# Patient Record
Sex: MALE | Race: BLACK OR AFRICAN AMERICAN | Employment: OTHER | URBAN - METROPOLITAN AREA
[De-identification: names, ages, dates, MRNs, and addresses within clinical notes are randomized per-mention and may not be internally consistent; named-entity substitution may affect disease eponyms.]

---

## 2023-05-18 ENCOUNTER — APPOINTMENT (OUTPATIENT)
Dept: GENERAL RADIOLOGY | Facility: HOSPITAL | Age: 73
End: 2023-05-18
Attending: EMERGENCY MEDICINE
Payer: MEDICARE

## 2023-05-18 ENCOUNTER — HOSPITAL ENCOUNTER (EMERGENCY)
Facility: HOSPITAL | Age: 73
Discharge: HOME | End: 2023-05-18
Attending: EMERGENCY MEDICINE
Payer: MEDICARE

## 2023-05-18 ENCOUNTER — APPOINTMENT (OUTPATIENT)
Dept: CT IMAGING | Facility: HOSPITAL | Age: 73
End: 2023-05-18
Attending: EMERGENCY MEDICINE
Payer: MEDICARE

## 2023-05-18 VITALS
SYSTOLIC BLOOD PRESSURE: 144 MMHG | RESPIRATION RATE: 17 BRPM | OXYGEN SATURATION: 97 % | TEMPERATURE: 97 F | HEART RATE: 64 BPM | DIASTOLIC BLOOD PRESSURE: 85 MMHG

## 2023-05-18 DIAGNOSIS — N18.9 CHRONIC KIDNEY DISEASE, UNSPECIFIED CKD STAGE: ICD-10-CM

## 2023-05-18 DIAGNOSIS — F17.200 TOBACCO USE DISORDER: ICD-10-CM

## 2023-05-18 DIAGNOSIS — I73.9 PERIPHERAL ARTERIAL DISEASE (HCC): Primary | ICD-10-CM

## 2023-05-18 LAB
ALBUMIN SERPL-MCNC: 3.4 G/DL (ref 3.4–5)
ALBUMIN SERPL-MCNC: 3.4 G/DL (ref 3.4–5)
ALBUMIN/GLOB SERPL: 0.7 {RATIO} (ref 1–2)
ALP LIVER SERPL-CCNC: 76 U/L
ALP LIVER SERPL-CCNC: 76 U/L
ALT SERPL-CCNC: 19 U/L
ALT SERPL-CCNC: 19 U/L
ANION GAP SERPL CALC-SCNC: 4 MMOL/L (ref 0–18)
AST SERPL-CCNC: 19 U/L (ref 15–37)
AST SERPL-CCNC: 19 U/L (ref 15–37)
BASOPHILS # BLD AUTO: 0.06 X10(3) UL (ref 0–0.2)
BASOPHILS NFR BLD AUTO: 0.6 %
BILIRUB DIRECT SERPL-MCNC: <0.1 MG/DL (ref 0–0.2)
BILIRUB SERPL-MCNC: 0.3 MG/DL (ref 0.1–2)
BILIRUB SERPL-MCNC: 0.3 MG/DL (ref 0.1–2)
BILIRUB UR QL: NEGATIVE
BUN BLD-MCNC: 27 MG/DL (ref 7–18)
BUN/CREAT SERPL: 14.6 (ref 10–20)
CALCIUM BLD-MCNC: 9.5 MG/DL (ref 8.5–10.1)
CHLORIDE SERPL-SCNC: 112 MMOL/L (ref 98–112)
CLARITY UR: CLEAR
CO2 SERPL-SCNC: 24 MMOL/L (ref 21–32)
COLOR UR: COLORLESS
CREAT BLD-MCNC: 1.85 MG/DL
DEPRECATED RDW RBC AUTO: 51.6 FL (ref 35.1–46.3)
EOSINOPHIL # BLD AUTO: 0.29 X10(3) UL (ref 0–0.7)
EOSINOPHIL NFR BLD AUTO: 2.8 %
ERYTHROCYTE [DISTWIDTH] IN BLOOD BY AUTOMATED COUNT: 16 % (ref 11–15)
GFR SERPLBLD BASED ON 1.73 SQ M-ARVRAT: 39 ML/MIN/1.73M2 (ref 60–?)
GLOBULIN PLAS-MCNC: 4.7 G/DL (ref 2.8–4.4)
GLUCOSE BLD-MCNC: 119 MG/DL (ref 70–99)
GLUCOSE BLDC GLUCOMTR-MCNC: 125 MG/DL (ref 70–99)
GLUCOSE UR-MCNC: NORMAL MG/DL
HCT VFR BLD AUTO: 41.2 %
HGB BLD-MCNC: 14 G/DL
HGB UR QL STRIP.AUTO: NEGATIVE
HYALINE CASTS #/AREA URNS AUTO: PRESENT /LPF
IMM GRANULOCYTES # BLD AUTO: 0.02 X10(3) UL (ref 0–1)
IMM GRANULOCYTES NFR BLD: 0.2 %
KETONES UR-MCNC: NEGATIVE MG/DL
LEUKOCYTE ESTERASE UR QL STRIP.AUTO: 25
LIPASE SERPL-CCNC: 45 U/L (ref 13–75)
LYMPHOCYTES # BLD AUTO: 5.16 X10(3) UL (ref 1–4)
LYMPHOCYTES NFR BLD AUTO: 49.8 %
MCH RBC QN AUTO: 30 PG (ref 26–34)
MCHC RBC AUTO-ENTMCNC: 34 G/DL (ref 31–37)
MCV RBC AUTO: 88.2 FL
MONOCYTES # BLD AUTO: 0.61 X10(3) UL (ref 0.1–1)
MONOCYTES NFR BLD AUTO: 5.9 %
NEUTROPHILS # BLD AUTO: 4.22 X10 (3) UL (ref 1.5–7.7)
NEUTROPHILS # BLD AUTO: 4.22 X10(3) UL (ref 1.5–7.7)
NEUTROPHILS NFR BLD AUTO: 40.7 %
NITRITE UR QL STRIP.AUTO: NEGATIVE
OSMOLALITY SERPL CALC.SUM OF ELEC: 296 MOSM/KG (ref 275–295)
PH UR: 5.5 [PH] (ref 5–8)
PLATELET # BLD AUTO: 282 10(3)UL (ref 150–450)
POTASSIUM SERPL-SCNC: 4.4 MMOL/L (ref 3.5–5.1)
PROT SERPL-MCNC: 8.1 G/DL (ref 6.4–8.2)
PROT SERPL-MCNC: 8.1 G/DL (ref 6.4–8.2)
PROT UR-MCNC: NEGATIVE MG/DL
RBC # BLD AUTO: 4.67 X10(6)UL
SODIUM SERPL-SCNC: 140 MMOL/L (ref 136–145)
SP GR UR STRIP: >1.03 (ref 1–1.03)
TROPONIN I HIGH SENSITIVITY: 12 NG/L
UROBILINOGEN UR STRIP-ACNC: NORMAL
WBC # BLD AUTO: 10.4 X10(3) UL (ref 4–11)

## 2023-05-18 PROCEDURE — 71045 X-RAY EXAM CHEST 1 VIEW: CPT | Performed by: EMERGENCY MEDICINE

## 2023-05-18 PROCEDURE — 99285 EMERGENCY DEPT VISIT HI MDM: CPT

## 2023-05-18 PROCEDURE — 81001 URINALYSIS AUTO W/SCOPE: CPT | Performed by: EMERGENCY MEDICINE

## 2023-05-18 PROCEDURE — 82248 BILIRUBIN DIRECT: CPT | Performed by: EMERGENCY MEDICINE

## 2023-05-18 PROCEDURE — 84484 ASSAY OF TROPONIN QUANT: CPT | Performed by: EMERGENCY MEDICINE

## 2023-05-18 PROCEDURE — 87086 URINE CULTURE/COLONY COUNT: CPT | Performed by: EMERGENCY MEDICINE

## 2023-05-18 PROCEDURE — 99284 EMERGENCY DEPT VISIT MOD MDM: CPT

## 2023-05-18 PROCEDURE — 80053 COMPREHEN METABOLIC PANEL: CPT | Performed by: EMERGENCY MEDICINE

## 2023-05-18 PROCEDURE — 83690 ASSAY OF LIPASE: CPT | Performed by: EMERGENCY MEDICINE

## 2023-05-18 PROCEDURE — 85025 COMPLETE CBC W/AUTO DIFF WBC: CPT | Performed by: EMERGENCY MEDICINE

## 2023-05-18 PROCEDURE — 82962 GLUCOSE BLOOD TEST: CPT

## 2023-05-18 PROCEDURE — 85060 BLOOD SMEAR INTERPRETATION: CPT | Performed by: EMERGENCY MEDICINE

## 2023-05-18 PROCEDURE — 93005 ELECTROCARDIOGRAM TRACING: CPT

## 2023-05-18 PROCEDURE — 36415 COLL VENOUS BLD VENIPUNCTURE: CPT

## 2023-05-18 PROCEDURE — 74177 CT ABD & PELVIS W/CONTRAST: CPT | Performed by: EMERGENCY MEDICINE

## 2023-05-18 PROCEDURE — 93010 ELECTROCARDIOGRAM REPORT: CPT

## 2023-05-18 RX ORDER — POLYETHYLENE GLYCOL 3350 17 G
2 POWDER IN PACKET (EA) ORAL AS NEEDED
Qty: 45 EACH | Refills: 0 | Status: SHIPPED | OUTPATIENT
Start: 2023-05-18 | End: 2023-05-18

## 2023-05-18 RX ORDER — POLYETHYLENE GLYCOL 3350 17 G
2 POWDER IN PACKET (EA) ORAL AS NEEDED
Qty: 45 LOZENGE | Refills: 0 | Status: SHIPPED | OUTPATIENT
Start: 2023-05-18

## 2023-05-18 NOTE — ED INITIAL ASSESSMENT (HPI)
After mowing lawn this am, pt developed generalized abdominal pain with vomiting x2 (which started just pta). Family endorse recent weight loss, thirst, frequent urination.  No hx of dm.    pmh of dementia, hchol  nkda

## 2023-05-19 LAB
ATRIAL RATE: 68 BPM
P AXIS: 81 DEGREES
P-R INTERVAL: 148 MS
Q-T INTERVAL: 384 MS
QRS DURATION: 106 MS
QTC CALCULATION (BEZET): 408 MS
R AXIS: 103 DEGREES
T AXIS: 63 DEGREES
VENTRICULAR RATE: 68 BPM

## 2024-07-26 ENCOUNTER — APPOINTMENT (OUTPATIENT)
Dept: GENERAL RADIOLOGY | Facility: HOSPITAL | Age: 74
End: 2024-07-26
Attending: EMERGENCY MEDICINE
Payer: MEDICARE

## 2024-07-26 ENCOUNTER — HOSPITAL ENCOUNTER (INPATIENT)
Facility: HOSPITAL | Age: 74
LOS: 1 days | Discharge: HOME | End: 2024-07-27
Attending: EMERGENCY MEDICINE | Admitting: HOSPITALIST
Payer: MEDICARE

## 2024-07-26 DIAGNOSIS — R07.9 CHEST PAIN WITH MODERATE RISK FOR CARDIAC ETIOLOGY: Primary | ICD-10-CM

## 2024-07-26 LAB
ANION GAP SERPL CALC-SCNC: 5 MMOL/L (ref 0–18)
BASOPHILS # BLD AUTO: 0.05 X10(3) UL (ref 0–0.2)
BASOPHILS NFR BLD AUTO: 0.6 %
BUN BLD-MCNC: 30 MG/DL (ref 9–23)
BUN/CREAT SERPL: 17.6 (ref 10–20)
CALCIUM BLD-MCNC: 9.5 MG/DL (ref 8.7–10.4)
CHLORIDE SERPL-SCNC: 113 MMOL/L (ref 98–112)
CO2 SERPL-SCNC: 27 MMOL/L (ref 21–32)
CREAT BLD-MCNC: 1.7 MG/DL
DEPRECATED RDW RBC AUTO: 49.1 FL (ref 35.1–46.3)
EGFRCR SERPLBLD CKD-EPI 2021: 43 ML/MIN/1.73M2 (ref 60–?)
EOSINOPHIL # BLD AUTO: 0.19 X10(3) UL (ref 0–0.7)
EOSINOPHIL NFR BLD AUTO: 2.1 %
ERYTHROCYTE [DISTWIDTH] IN BLOOD BY AUTOMATED COUNT: 15.9 % (ref 11–15)
GLUCOSE BLD-MCNC: 94 MG/DL (ref 70–99)
HCT VFR BLD AUTO: 38 %
HGB BLD-MCNC: 13.4 G/DL
IMM GRANULOCYTES # BLD AUTO: 0.02 X10(3) UL (ref 0–1)
IMM GRANULOCYTES NFR BLD: 0.2 %
LYMPHOCYTES # BLD AUTO: 4.65 X10(3) UL (ref 1–4)
LYMPHOCYTES NFR BLD AUTO: 52.2 %
MCH RBC QN AUTO: 30 PG (ref 26–34)
MCHC RBC AUTO-ENTMCNC: 35.3 G/DL (ref 31–37)
MCV RBC AUTO: 85.2 FL
MONOCYTES # BLD AUTO: 0.53 X10(3) UL (ref 0.1–1)
MONOCYTES NFR BLD AUTO: 6 %
NEUTROPHILS # BLD AUTO: 3.46 X10 (3) UL (ref 1.5–7.7)
NEUTROPHILS # BLD AUTO: 3.46 X10(3) UL (ref 1.5–7.7)
NEUTROPHILS NFR BLD AUTO: 38.9 %
OSMOLALITY SERPL CALC.SUM OF ELEC: 306 MOSM/KG (ref 275–295)
PLATELET # BLD AUTO: 279 10(3)UL (ref 150–450)
POTASSIUM SERPL-SCNC: 5.7 MMOL/L (ref 3.5–5.1)
RBC # BLD AUTO: 4.46 X10(6)UL
SODIUM SERPL-SCNC: 145 MMOL/L (ref 136–145)
TROPONIN I SERPL HS-MCNC: 5 NG/L
TROPONIN I SERPL HS-MCNC: 6 NG/L
WBC # BLD AUTO: 8.9 X10(3) UL (ref 4–11)

## 2024-07-26 PROCEDURE — 99222 1ST HOSP IP/OBS MODERATE 55: CPT | Performed by: HOSPITALIST

## 2024-07-26 PROCEDURE — 71045 X-RAY EXAM CHEST 1 VIEW: CPT | Performed by: EMERGENCY MEDICINE

## 2024-07-26 RX ORDER — MORPHINE SULFATE 2 MG/ML
2 INJECTION, SOLUTION INTRAMUSCULAR; INTRAVENOUS ONCE
Status: COMPLETED | OUTPATIENT
Start: 2024-07-26 | End: 2024-07-26

## 2024-07-27 ENCOUNTER — APPOINTMENT (OUTPATIENT)
Dept: CV DIAGNOSTICS | Facility: HOSPITAL | Age: 74
End: 2024-07-27
Attending: HOSPITALIST
Payer: MEDICARE

## 2024-07-27 ENCOUNTER — APPOINTMENT (OUTPATIENT)
Dept: NUCLEAR MEDICINE | Facility: HOSPITAL | Age: 74
End: 2024-07-27
Attending: HOSPITALIST
Payer: MEDICARE

## 2024-07-27 VITALS
OXYGEN SATURATION: 98 % | WEIGHT: 138.63 LBS | HEART RATE: 52 BPM | DIASTOLIC BLOOD PRESSURE: 78 MMHG | TEMPERATURE: 99 F | BODY MASS INDEX: 18.78 KG/M2 | HEIGHT: 72 IN | SYSTOLIC BLOOD PRESSURE: 154 MMHG | RESPIRATION RATE: 18 BRPM

## 2024-07-27 LAB
AMPHET UR QL SCN: NEGATIVE
ANION GAP SERPL CALC-SCNC: 3 MMOL/L (ref 0–18)
BARBITURATES UR QL SCN: NEGATIVE
BENZODIAZ UR QL SCN: NEGATIVE
BUN BLD-MCNC: 28 MG/DL (ref 9–23)
BUN/CREAT SERPL: 17.8 (ref 10–20)
CALCIUM BLD-MCNC: 9.5 MG/DL (ref 8.7–10.4)
CANNABINOIDS UR QL SCN: NEGATIVE
CHLORIDE SERPL-SCNC: 113 MMOL/L (ref 98–112)
CO2 SERPL-SCNC: 27 MMOL/L (ref 21–32)
COCAINE UR QL: NEGATIVE
CREAT BLD-MCNC: 1.57 MG/DL
CREAT UR-SCNC: 59.8 MG/DL
EGFRCR SERPLBLD CKD-EPI 2021: 47 ML/MIN/1.73M2 (ref 60–?)
FENTANYL UR QL SCN: NEGATIVE
GLUCOSE BLD-MCNC: 89 MG/DL (ref 70–99)
MDMA UR QL SCN: NEGATIVE
METHADONE UR QL SCN: NEGATIVE
OSMOLALITY SERPL CALC.SUM OF ELEC: 301 MOSM/KG (ref 275–295)
OXYCODONE UR QL SCN: NEGATIVE
PCP UR QL SCN: NEGATIVE
POTASSIUM SERPL-SCNC: 5 MMOL/L (ref 3.5–5.1)
SODIUM SERPL-SCNC: 143 MMOL/L (ref 136–145)
TROPONIN I SERPL HS-MCNC: 6 NG/L

## 2024-07-27 PROCEDURE — 99239 HOSP IP/OBS DSCHRG MGMT >30: CPT | Performed by: HOSPITALIST

## 2024-07-27 PROCEDURE — 93017 CV STRESS TEST TRACING ONLY: CPT | Performed by: HOSPITALIST

## 2024-07-27 PROCEDURE — 78452 HT MUSCLE IMAGE SPECT MULT: CPT | Performed by: HOSPITALIST

## 2024-07-27 RX ORDER — PANTOPRAZOLE SODIUM 40 MG/1
40 TABLET, DELAYED RELEASE ORAL
Status: DISCONTINUED | OUTPATIENT
Start: 2024-07-27 | End: 2024-07-27

## 2024-07-27 RX ORDER — ONDANSETRON 2 MG/ML
4 INJECTION INTRAMUSCULAR; INTRAVENOUS EVERY 6 HOURS PRN
Status: DISCONTINUED | OUTPATIENT
Start: 2024-07-27 | End: 2024-07-27

## 2024-07-27 RX ORDER — ACETAMINOPHEN 325 MG/1
650 TABLET ORAL EVERY 6 HOURS PRN
Status: DISCONTINUED | OUTPATIENT
Start: 2024-07-27 | End: 2024-07-27

## 2024-07-27 RX ORDER — MAGNESIUM HYDROXIDE/ALUMINUM HYDROXICE/SIMETHICONE 120; 1200; 1200 MG/30ML; MG/30ML; MG/30ML
30 SUSPENSION ORAL 4 TIMES DAILY PRN
Status: DISCONTINUED | OUTPATIENT
Start: 2024-07-27 | End: 2024-07-27

## 2024-07-27 RX ORDER — HYDROCODONE BITARTRATE AND ACETAMINOPHEN 5; 325 MG/1; MG/1
1 TABLET ORAL EVERY 4 HOURS PRN
COMMUNITY

## 2024-07-27 RX ORDER — ATORVASTATIN CALCIUM 40 MG/1
40 TABLET, FILM COATED ORAL DAILY
Status: DISCONTINUED | OUTPATIENT
Start: 2024-07-27 | End: 2024-07-27

## 2024-07-27 RX ORDER — ATORVASTATIN CALCIUM 10 MG/1
10 TABLET, FILM COATED ORAL DAILY
Status: DISCONTINUED | OUTPATIENT
Start: 2024-07-27 | End: 2024-07-27

## 2024-07-27 RX ORDER — REGADENOSON 0.08 MG/ML
INJECTION, SOLUTION INTRAVENOUS
Status: COMPLETED
Start: 2024-07-27 | End: 2024-07-27

## 2024-07-27 RX ORDER — ZOLPIDEM TARTRATE 5 MG/1
5 TABLET ORAL NIGHTLY PRN
Status: DISCONTINUED | OUTPATIENT
Start: 2024-07-27 | End: 2024-07-27

## 2024-07-27 RX ORDER — ATORVASTATIN CALCIUM 10 MG/1
40 TABLET, FILM COATED ORAL NIGHTLY
COMMUNITY

## 2024-07-27 RX ORDER — HEPARIN SODIUM 5000 [USP'U]/ML
5000 INJECTION, SOLUTION INTRAVENOUS; SUBCUTANEOUS EVERY 12 HOURS
Status: DISCONTINUED | OUTPATIENT
Start: 2024-07-27 | End: 2024-07-27

## 2024-07-27 RX ORDER — ATORVASTATIN CALCIUM 40 MG/1
40 TABLET, FILM COATED ORAL DAILY
Status: DISCONTINUED | OUTPATIENT
Start: 2024-07-28 | End: 2024-07-27

## 2024-07-27 RX ORDER — HYDRALAZINE HYDROCHLORIDE 20 MG/ML
10 INJECTION INTRAMUSCULAR; INTRAVENOUS EVERY 4 HOURS PRN
Status: DISCONTINUED | OUTPATIENT
Start: 2024-07-27 | End: 2024-07-27

## 2024-07-27 RX ORDER — HYDROCODONE BITARTRATE AND ACETAMINOPHEN 5; 325 MG/1; MG/1
1 TABLET ORAL EVERY 6 HOURS PRN
Status: DISCONTINUED | OUTPATIENT
Start: 2024-07-27 | End: 2024-07-27

## 2024-07-27 NOTE — ED QUICK NOTES
Orders for admission, patient is aware of plan and ready to go upstairs. Any questions, please call ED RN Deepa at extension [phone].     Patient Covid vaccination status: Fully vaccinated     COVID Test Ordered in ED: None    COVID Suspicion at Admission: N/A    Running Infusions:  None    Mental Status/LOC at time of transport: a/o x 3, forgetful, hx of dementia    Other pertinent information:   CIWA score: N/A   NIH score:  N/A

## 2024-07-27 NOTE — PLAN OF CARE
Problem: Patient Centered Care  Goal: Patient preferences are identified and integrated in the patient's plan of care  Description: Interventions:  - Provide timely, complete, and accurate information to patient/family  - Incorporate patient and family knowledge, values, beliefs, and cultural backgrounds into the planning and delivery of care  - Encourage patient/family to participate in care and decision-making at the level they choose  - Honor patient and family perspectives and choices  Outcome: Progressing     Problem: Patient/Family Goals  Goal: Patient/Family Long Term Goal  Description: Patient's Long Term Goal: To be discharged home.    Interventions:  - Card consolation, pain control, stress test.   - See additional Care Plan goals for specific interventions  Outcome: Progressing  Goal: Patient/Family Short Term Goal  Description: Patient's Short Term Goal: Have no pain.     Interventions:   - Labs, pain management.   - See additional Care Plan goals for specific interventions  Outcome: Progressing     Problem: CARDIOVASCULAR - ADULT  Goal: Maintains optimal cardiac output and hemodynamic stability  Description: INTERVENTIONS:  - Monitor vital signs, rhythm, and trends  - Monitor for bleeding, hypotension and signs of decreased cardiac output  - Evaluate effectiveness of vasoactive medications to optimize hemodynamic stability  - Monitor arterial and/or venous puncture sites for bleeding and/or hematoma  - Assess quality of pulses, skin color and temperature  - Assess for signs of decreased coronary artery perfusion - ex. Angina  - Evaluate fluid balance, assess for edema, trend weights  Outcome: Progressing     Problem: RESPIRATORY - ADULT  Goal: Achieves optimal ventilation and oxygenation  Description: INTERVENTIONS:  - Assess for changes in respiratory status  - Assess for changes in mentation and behavior  - Position to facilitate oxygenation and minimize respiratory effort  - Oxygen supplementation  based on oxygen saturation or ABGs  - Provide Smoking Cessation handout, if applicable  - Encourage broncho-pulmonary hygiene including cough, deep breathe, Incentive Spirometry  - Assess the need for suctioning and perform as needed  - Assess and instruct to report SOB or any respiratory difficulty  - Respiratory Therapy support as indicated  - Manage/alleviate anxiety  - Monitor for signs/symptoms of CO2 retention  Outcome: Progressing     Problem: PAIN - ADULT  Goal: Verbalizes/displays adequate comfort level or patient's stated pain goal  Description: INTERVENTIONS:  - Encourage pt to monitor pain and request assistance  - Assess pain using appropriate pain scale  - Administer analgesics based on type and severity of pain and evaluate response  - Implement non-pharmacological measures as appropriate and evaluate response  - Consider cultural and social influences on pain and pain management  - Manage/alleviate anxiety  - Utilize distraction and/or relaxation techniques  - Monitor for opioid side effects  - Notify MD/LIP if interventions unsuccessful or patient reports new pain  - Anticipate increased pain with activity and pre-medicate as appropriate  Outcome: Progressing   Pt was transferred to PMU from the ED. Pt is A&Ox3. He ambulates with standby assist. Pt's son is at the bedside. Vitals are stable and plan to have stress test later this AM. Safety precaution in place and call light is within reach.

## 2024-07-27 NOTE — DISCHARGE SUMMARY
[facility]  part of [facility]    Discharge Summary    Julio Velasquez Patient Status:  Inpatient    1950 MRN [id number]   Location [facility]5W Attending Shantell Osborn MD   Hosp Day # 0 PCP No primary care provider on file.     Date of Admission: 2024 Disposition: Home or Self Care     Date of Discharge: 24      Admitting Diagnosis: Chest pain with moderate risk for cardiac etiology [R07.9]    Hospital Discharge Diagnoses:  chest pain    Lace+ Score: 37  59-90 High Risk  29-58 Medium Risk  0-28   Low Risk.    TCM Follow-Up Recommendation:  LACE 29-58: Moderate Risk of readmission after discharge from the hospital.      Problem List:   Patient Active Problem List   Diagnosis    Chest pain with moderate risk for cardiac etiology       Reason for Admission:   Chest pain  Hyperkalemia      Physical Exam:   General appearance: alert, appears stated age and cooperative  Pulmonary:  clear to auscultation bilaterally  Cardiovascular: S1, S2 normal, no murmur, click, rub or gallop, regular rate and rhythm  Abdominal: soft, non-tender; bowel sounds normal; no masses,  no organomegaly  Extremities: extremities normal, atraumatic, no cyanosis or edema  Psychiatric: calm      History of Present Illness:   Per Dr. Sandra  Julio Velasquez is a(n) 73 year old male, with history significant for hyperlipidemia dementia and cocaine use with a complaint of substernal chest pain that started rather spontaneously earlier yesterday.  Describes the pain as a 9 out of 10 sharp nonradiating aggravating or relieving factors.  Denies any associated shortness of breath nausea vomiting palpitations or diaphoresis.  Denies having experienced similar symptoms in the past.  Claims his current pain level is a 5 out of 10     Hospital Course:   Chest pain possible ACS  -troponins negative  -nuclear stress test--> ef 67%, no wall motion abnormalities    Mild HTN  -plan ambulatory bp monitoring and  follow-up with pcp     Hyperkalemia  -IV fluids initiated, repeat BMP improve  -op f/u     CKD stage III  Renal function currently at baseline  -op follow-up     Tobacco abuse  Patient counseled encouraged to quit.    Consultations: n/a    Procedures: n/a    Complications: n/a    Discharge Condition: Good    Discharge Medications:      Discharge Medications        CONTINUE taking these medications        Instructions Prescription details   atorvastatin 10 MG Tabs  Commonly known as: Lipitor      Take 4 tablets (40 mg total) by mouth nightly.   Refills: 0     HYDROcodone-acetaminophen 5-325 MG Tabs  Commonly known as: Norco      Take 1 tablet by mouth every 4 (four) hours as needed for Pain. 1 tab 4-6 hours as needed.   Refills: 0              Follow up Visits: Follow-up with pcp in 1 week    Follow up Labs: n/a     Other Discharge Instructions: ambulatory bp monitoring    SHARON QUINTERO MD  7/27/2024  1:37 PM    > 35 min

## 2024-07-27 NOTE — H&P
[facility]  part of [facility]    History & Physical    Julio Velasquez Patient Status:  Emergency    1950 MRN [id number]   Location [facility] EMERGENCY DEPARTMENT Attending Sujatha Deleon MD   Hosp Day # 0 PCP No primary care provider on file.     Date:  2024  Date of Admission:  2024    Chief Complaint:  Chief Complaint   Patient presents with    Chest Pain       History of Present Illness:  Julio Velasquez is a(n) 73 year old male, with history significant for hyperlipidemia dementia and cocaine use with a complaint of substernal chest pain that started rather spontaneously earlier yesterday.  Describes the pain as a 9 out of 10 sharp nonradiating aggravating or relieving factors.  Denies any associated shortness of breath nausea vomiting palpitations or diaphoresis.  Denies having experienced similar symptoms in the past.  Claims his current pain level is a 5 out of 10      History:  Past Medical History:    Dementia (HCC)    Hyperlipidemia     History reviewed. No pertinent surgical history.  Family history: No sick contacts   reports that he has been smoking cigarettes. He has never used smokeless tobacco. He reports that he does not drink alcohol and does not use drugs.    Allergies:  No Known Allergies    Home Medications:  Prior to Admission Medications   Prescriptions Last Dose Informant Patient Reported? Taking?   nicotine polacrilex (NICOTINE MINI) 2 MG Mouth/Throat Lozenge   No No   Sig: Place 1 lozenge (2 mg total) inside cheek as needed for Smoking cessation.      Facility-Administered Medications: None       Review of Systems:  Constitutional:  Weakness, Fatigue.  Eye:  Negative.  Ear/Nose/Mouth/Throat:  Negative.  Respiratory:  Negative  Cardiovascular: Chest pain  Gastrointestinal:  Negative.  Genitourinary:  Negative  Endocrine:  Negative.  Immunologic:  Negative.  Musculoskeletal:  Negative.  Integumentary:  Negative.  Neurologic:   Negative.  Psychiatric:  Negative.  ROS reviewed as documented in chart    Physical Exam:  Temp:  [97.6 °F (36.4 °C)] 97.6 °F (36.4 °C)  Pulse:  [50-61] 51  Resp:  [11-18] 11  BP: (119-133)/(63-77) 133/76  SpO2:  [97 %-99 %] 99 %    General:  Alert and oriented.  Diffuse skin problem:  None.  Eye:  Pupils are equal, round and reactive to light, extraocular movements are intact, Normal conjunctiva.  HENT:  Normocephalic, oral mucosa is moist.  Head:  Normocephalic, atraumatic.  Neck:  Supple, non-tender, no carotid bruit, no jugular venous distention, no lymphadenopathy, no thyromegaly.  Respiratory:  Lungs are clear to auscultation, respirations are non-labored, breath sounds are equal, symmetrical chest wall expansion.  Cardiovascular:  Normal rate, regular rhythm, no murmur, no edema.  Gastrointestinal:  Soft, non-tender, non-distended, normal bowel sounds, no organomegaly.  Lymphatics:  No lymphadenopathy neck, axilla, groin.  Musculoskeletal: Normal range of motion.  normal strength.  Feet:  Normal pulses.  Neurologic:  Alert, oriented, no focal deficits, cranial nerves II-XII are grossly intact.  Cognition and Speech:  Oriented, speech clear and coherent.  Psychiatric:  Cooperative, appropriate mood & affect.      Laboratory Data:   Lab Results   Component Value Date    WBC 8.9 07/26/2024    HGB 13.4 07/26/2024    HCT 38.0 07/26/2024    .0 07/26/2024    CREATSERUM 1.70 07/26/2024    BUN 30 07/26/2024     07/26/2024    K 5.7 07/26/2024     07/26/2024    CO2 27.0 07/26/2024    GLU 94 07/26/2024    CA 9.5 07/26/2024       Imaging:  XR CHEST AP PORTABLE  (CPT=71045)    Result Date: 7/26/2024  CONCLUSION: No radiographic evidence of acute cardiopulmonary abnormality.    elm-remote    Dictated by (CST): Mikie Ewing MD on 7/26/2024 at 8:56 PM     Finalized by (CST): Mikie Ewing MD on 7/26/2024 at 8:56 PM            Assessment and Plan:    Chest pain possible ACS  Initial troponin negative, EKG  with no acute changes will get serial troponins to further evaluate.  If troponins negative will get stress test in a.m.    Hyperkalemia  IV fluids initiated, repeat BMP in AM.    CKD stage III  Renal function currently at baseline, will continue to monitor as needed, avoid nephrotoxic medications.    Tobacco abuse  Patient counseled encouraged to quit.    Prophylaxis  Subcutaneous heparin    CODE STATUS  Full    Primary care physician  No primary care provider on file.    60 minutes spent on this admission - examining patient, obtaining history, reviewing previous medical records, going over test results/imaging and discussing plan of care. All questions answered.     Disposition  Clinical course will dictate outcome      MERYL CAMPBELL MD  7/26/2024  9:38 PM

## 2024-07-27 NOTE — ED QUICK NOTES
Pt requesting to ambulate to bathroom. Per Adrienne MENDOZA, the pt is not to be ambulated due to chest pain. Pt was assisted to stand at bedside and use urinal for voiding.

## 2024-07-27 NOTE — PLAN OF CARE
Pt AO x 4 on RA able to state needs call light in place, family at bedside. Pt discharged to home with son. IV removed. No questions or concerns at this time.     Problem: Patient Centered Care  Goal: Patient preferences are identified and integrated in the patient's plan of care  Description: Interventions:  - What would you like us to know as we care for you? Would like to go home  - Provide timely, complete, and accurate information to patient/family  - Incorporate patient and family knowledge, values, beliefs, and cultural backgrounds into the planning and delivery of care  - Encourage patient/family to participate in care and decision-making at the level they choose  - Honor patient and family perspectives and choices  Outcome: Progressing     Problem: Patient/Family Goals  Goal: Patient/Family Long Term Goal  Description: Patient's Long Term Goal: To go home    Interventions:  - cardiac work up   - See additional Care Plan goals for specific interventions  Outcome: Progressing  Goal: Patient/Family Short Term Goal  Description: Patient's Short Term Goal: cardiac work up    Interventions:   - stress test  - See additional Care Plan goals for specific interventions  Outcome: Progressing     Problem: CARDIOVASCULAR - ADULT  Goal: Maintains optimal cardiac output and hemodynamic stability  Description: INTERVENTIONS:  - Monitor vital signs, rhythm, and trends  - Monitor for bleeding, hypotension and signs of decreased cardiac output  - Evaluate effectiveness of vasoactive medications to optimize hemodynamic stability  - Monitor arterial and/or venous puncture sites for bleeding and/or hematoma  - Assess quality of pulses, skin color and temperature  - Assess for signs of decreased coronary artery perfusion - ex. Angina  - Evaluate fluid balance, assess for edema, trend weights  Outcome: Progressing     Problem: RESPIRATORY - ADULT  Goal: Achieves optimal ventilation and oxygenation  Description: INTERVENTIONS:  -  Assess for changes in respiratory status  - Assess for changes in mentation and behavior  - Position to facilitate oxygenation and minimize respiratory effort  - Oxygen supplementation based on oxygen saturation or ABGs  - Provide Smoking Cessation handout, if applicable  - Encourage broncho-pulmonary hygiene including cough, deep breathe, Incentive Spirometry  - Assess the need for suctioning and perform as needed  - Assess and instruct to report SOB or any respiratory difficulty  - Respiratory Therapy support as indicated  - Manage/alleviate anxiety  - Monitor for signs/symptoms of CO2 retention  Outcome: Progressing     Problem: PAIN - ADULT  Goal: Verbalizes/displays adequate comfort level or patient's stated pain goal  Description: INTERVENTIONS:  - Encourage pt to monitor pain and request assistance  - Assess pain using appropriate pain scale  - Administer analgesics based on type and severity of pain and evaluate response  - Implement non-pharmacological measures as appropriate and evaluate response  - Consider cultural and social influences on pain and pain management  - Manage/alleviate anxiety  - Utilize distraction and/or relaxation techniques  - Monitor for opioid side effects  - Notify MD/LIP if interventions unsuccessful or patient reports new pain  - Anticipate increased pain with activity and pre-medicate as appropriate  Outcome: Progressing

## 2024-07-27 NOTE — ED PROVIDER NOTES
Patient Seen in: [facility] Emergency Department    History     Chief Complaint   Patient presents with    Chest Pain     Stated Complaint: chest pain    HPI    73 year old male presenting with chest pain. Pain began at noon while he was sitting at home . The patient describes the pain as sharp and intermittent, without radiation. Patient rates pain as a 9/10 in intensity.  Associated symptoms are none.  Aggravating factors are none.  Alleviating factors are:  none.   Patient's cardiac risk factors are age, HTN, PAD.    Patient's risk factors for DVT/PE: none.      Past Medical History:    Dementia (HCC)    Hyperlipidemia       History reviewed. No pertinent surgical history.         No family history on file.    Social History     Socioeconomic History    Marital status:    Tobacco Use    Smoking status: Every Day     Types: Cigarettes    Smokeless tobacco: Never   Vaping Use    Vaping status: Never Used   Substance and Sexual Activity    Alcohol use: Never    Drug use: Never     Social Determinants of Health     Food Insecurity: No Food Insecurity (12/1/2020)    Received from [facility]    Hunger Vital Sign     Worried About Running Out of Food in the Last Year: Never true     Ran Out of Food in the Last Year: Never true   Transportation Needs: No Transportation Needs (12/1/2020)    Received from [facility]    PRAPARE - Transportation     Lack of Transportation (Medical): No     Lack of Transportation (Non-Medical): No       Review of Systems    Positive for stated complaint: chest pain  Other systems are as noted in HPI.  Constitutional and vital signs reviewed.      All other systems reviewed and negative except as noted above.    PSFH elements reviewed from today and agreed except as otherwise stated in HPI.    Physical Exam     ED Triage Vitals [07/26/24 1951]   /67   Pulse 61   Resp 18   Temp 97.6 °F (36.4 °C)   Temp src    SpO2 99 %   O2  Device        Current:/77   Pulse 51   Temp 97.6 °F (36.4 °C)   Resp 18   Ht 182.9 cm (6')   Wt 65.8 kg   SpO2 98%   BMI 19.67 kg/m²   PULSE OX 97% room air        Physical Exam    Constitutional: Speaks in full sentences, no acute distress  HENT: mmm, no lesions,  Neck: normal range of motion, no tenderness, supple.  Eyes: PERRL, EOMI, conjunctiva normal, no discharge. Sclera anicteric.  Cardiovascular: Regular rate rhythm, normal S1-S2, no extra sounds or murmurs, no peripheral edema, bilateral radial pulses 2+  Respiratory: Normal breath sounds, no respiratory distress, no wheezing, no chest tenderness.  GI: Bowel sounds normal, Soft, no tenderness, no masses, no pulsatile masses.  : No CVA tenderness.  Skin: Warm, dry, no erythema, no rash.  Musculoskeletal: Intact distal pulses, no edema, no tenderness, no cyanosis, no clubbing. Good range of motion in all major joints. No tenderness to palpation or major deformities noted. Back- No tenderness.  Neurologic: Alert & oriented x 3, normal motor function, normal sensory function, no focal deficits noted.  Psych: Calm, cooperative, nl affect    Differential diagnosis to include Acute coronary syndrome vs. Acute pulmonary process including pneumothorax vs. Dissection vs.  pleurisy vs. PE vs. Pneumonia/effusion vs. GI related pathology such as GERD or gastritis, vs. Musculoskeletal        ED Course     Labs Reviewed   BASIC METABOLIC PANEL (8) - Abnormal; Notable for the following components:       Result Value    Potassium 5.7 (*)     Chloride 113 (*)     BUN 30 (*)     Creatinine 1.70 (*)     Calculated Osmolality 306 (*)     eGFR-Cr 43 (*)     All other components within normal limits   CBC W/ DIFFERENTIAL - Abnormal; Notable for the following components:    HCT 38.0 (*)     RDW-SD 49.1 (*)     RDW 15.9 (*)     Lymphocyte Absolute 4.65 (*)     All other components within normal limits   TROPONIN I HIGH SENSITIVITY - Normal   CBC WITH DIFFERENTIAL  WITH PLATELET    Narrative:     The following orders were created for panel order CBC With Differential With Platelet.  Procedure                               Abnormality         Status                     ---------                               -----------         ------                     CBC W/ DIFFERENTIAL[[id number]]          Abnormal            Final result                 Please view results for these tests on the individual orders.   SCAN SLIDE   TROPONIN I HIGH SENSITIVITY   RAINBOW DRAW LAVENDER   RAINBOW DRAW LIGHT GREEN   RAINBOW DRAW BLUE     EKG    Rate, intervals and axes as noted on EKG Report.  Rate: 58  Rhythm: Sinus Rhythm  Reading: No STEMI, no ectopy           MDM     Monitor Interpretation:  Sinus rhythm    Radiology Interpretation:  XR CHEST AP PORTABLE  (CPT=71045)    Result Date: 7/26/2024  CONCLUSION: No radiographic evidence of acute cardiopulmonary abnormality.    elm-remote    Dictated by (CST): Mikie Ewing MD on 7/26/2024 at 8:56 PM     Finalized by (CST): Mikie Ewing MD on 7/26/2024 at 8:56 PM           Aortic Dissection Risk Assesment:    High risk Conditions (Marfan, Fhx,Known aortic valve disease, known dissection or recent aortic manipulation) none  High Risk Pain Features (Severe. Abrupt Ripping or tearing) none  High Risk Exam Features (pulse deficit, BP difference, focal neuro deficit,murmur of aortic insufficiency, hypotension or shock) none     Medical Decision Making  Vital stable in the ED.  No acute distress.  I ordered interpreted labs including chemistry which shows ongoing CKD, as well as slight hyperkalemia, high-sensitivity troponin within normal limits.  Per my independent interpretation of chest x-ray, no mediastinal widening.  D dissection and PE was considered however felt to be less likely at this time and as such advanced imaging will be deferred.  Espite nitroglycerin and aspirin in the field as well as morphine in the ED continues to have ongoing pain.   Given ongoing pain, risk factors and age will admit for cardiac rule out.  Discussed with Dr. Sandra for admission.    Problems Addressed:  Chest pain with moderate risk for cardiac etiology: complicated acute illness or injury with systemic symptoms that poses a threat to life or bodily functions    Amount and/or Complexity of Data Reviewed  Independent Historian: EMS     Details: EMS gave SL nitroglycerin and full dose aspirin without relief.      Labs: ordered. Decision-making details documented in ED Course.  Radiology: ordered and independent interpretation performed. Decision-making details documented in ED Course.  ECG/medicine tests: ordered and independent interpretation performed. Decision-making details documented in ED Course.  Discussion of management or test interpretation with external provider(s): As above    Risk  Parenteral controlled substances.  Decision regarding hospitalization.            Disposition and Plan     Clinical Impression:  1. Chest pain with moderate risk for cardiac etiology        Disposition:  Admit    Follow-up:  No follow-up provider specified.    Medications Prescribed:  Current Discharge Medication List          Hospital Problems       Present on Admission             ICD-10-CM Noted POA    * (Principal) Chest pain with moderate risk for cardiac etiology R07.9 7/26/2024 Unknown

## 2024-07-27 NOTE — ED INITIAL ASSESSMENT (HPI)
Patient arrives by EMS for complaints of chest pain that began today at noon. EMS states son told them pain worsened for the patient PTA. Denies SOB. Denies n/v/d/f. Hx of leg stents approximately 20 years ago.

## 2024-07-28 LAB
ATRIAL RATE: 58 BPM
P AXIS: 74 DEGREES
P-R INTERVAL: 160 MS
Q-T INTERVAL: 404 MS
QRS DURATION: 96 MS
QTC CALCULATION (BEZET): 396 MS
R AXIS: 48 DEGREES
T AXIS: 48 DEGREES
VENTRICULAR RATE: 58 BPM

## 2024-07-29 LAB
% OF MAX PREDICTED HR: 100 %
MAX DIASTOLIC BP: 76 MMHG
MAX HEART RATE: 89 BPM
MAX PREDICTED HEART RATE: 149 BPM
MAX SYSTOLIC BP: 153 MMHG
MAX WORK LOAD: 10

## 2024-07-29 NOTE — PAYOR COMM NOTE
--------------  ADMISSION REVIEW     Payor: CECILE WALLACE [facility]  Subscriber #:  [id number]  Authorization Number: [id number]    Admit date: 7/27/24  Admit time: 12:03 AM       REVIEW DOCUMENTATION:  ED Provider Notes signed by Sujatha Deleon MD at 7/26/2024 10:21 PM      Patient Seen in: [facility] Emergency Department    History     Chief Complaint   Patient presents with    Chest Pain     Stated Complaint: chest pain    HPI    73 year old male presenting with chest pain. Pain began at noon while he was sitting at home . The patient describes the pain as sharp and intermittent, without radiation. Patient rates pain as a 9/10 in intensity.  Associated symptoms are none.  Aggravating factors are none.  Alleviating factors are:  none.   Patient's cardiac risk factors are age, HTN, PAD.    Patient's risk factors for DVT/PE: none.      Past Medical History:    Dementia (HCC)    Hyperlipidemia     Positive for stated complaint: chest pain  Other systems are as noted in HPI.  Constitutional and vital signs reviewed.      All other systems reviewed and negative except as noted above.    PSFH elements reviewed from today and agreed except as otherwise stated in HPI.    Physical Exam     ED Triage Vitals [07/26/24 1951]   /67   Pulse 61   Resp 18   Temp 97.6 °F (36.4 °C)   Temp src    SpO2 99 %   O2 Device        Current:/77   Pulse 51   Temp 97.6 °F (36.4 °C)   Resp 18   Ht 182.9 cm (6')   Wt 65.8 kg   SpO2 98%   BMI 19.67 kg/m²   PULSE OX 97% room air        Physical Exam    Constitutional: Speaks in full sentences, no acute distress  HENT: mmm, no lesions,  Neck: normal range of motion, no tenderness, supple.  Eyes: PERRL, EOMI, conjunctiva normal, no discharge. Sclera anicteric.  Cardiovascular: Regular rate rhythm, normal S1-S2, no extra sounds or murmurs, no peripheral edema, bilateral radial pulses 2+  Respiratory: Normal breath sounds, no respiratory distress, no wheezing, no chest tenderness.  GI:  Bowel sounds normal, Soft, no tenderness, no masses, no pulsatile masses.  : No CVA tenderness.  Skin: Warm, dry, no erythema, no rash.  Musculoskeletal: Intact distal pulses, no edema, no tenderness, no cyanosis, no clubbing. Good range of motion in all major joints. No tenderness to palpation or major deformities noted. Back- No tenderness.  Neurologic: Alert & oriented x 3, normal motor function, normal sensory function, no focal deficits noted.  Psych: Calm, cooperative, nl affect    Differential diagnosis to include Acute coronary syndrome vs. Acute pulmonary process including pneumothorax vs. Dissection vs.  pleurisy vs. PE vs. Pneumonia/effusion vs. GI related pathology such as GERD or gastritis, vs. Musculoskeletal        ED Course     Labs Reviewed   BASIC METABOLIC PANEL (8) - Abnormal; Notable for the following components:       Result Value    Potassium 5.7 (*)     Chloride 113 (*)     BUN 30 (*)     Creatinine 1.70 (*)     Calculated Osmolality 306 (*)     eGFR-Cr 43 (*)     All other components within normal limits   CBC W/ DIFFERENTIAL - Abnormal; Notable for the following components:    HCT 38.0 (*)     RDW-SD 49.1 (*)     RDW 15.9 (*)     Lymphocyte Absolute 4.65 (*)     All other components within normal limits   TROPONIN I HIGH SENSITIVITY - Normal   CBC WITH DIFFERENTIAL WITH PLATELET    Narrative:     The following orders were created for panel order CBC With Differential With Platelet.  Procedure                               Abnormality         Status                     ---------                               -----------         ------                     CBC W/ DIFFERENTIAL[[id number]]          Abnormal            Final result                 Please view results for these tests on the individual orders.   SCAN SLIDE   TROPONIN I HIGH SENSITIVITY   RAINBOW DRAW LAVENDER   RAINBOW DRAW LIGHT GREEN   RAINBOW DRAW BLUE     EKG    Rate, intervals and axes as noted on EKG Report.  Rate:  58  Rhythm: Sinus Rhythm  Reading: No STEMI, no ectopy           MDM     Monitor Interpretation:  Sinus rhythm    Radiology Interpretation:  XR CHEST AP PORTABLE  (CPT=71045)    Result Date: 7/26/2024  CONCLUSION: No radiographic evidence of acute cardiopulmonary abnormality.    elm-remote    Dictated by (CST): Mikie Ewing MD on 7/26/2024 at 8:56 PM     Finalized by (CST): Mikie Ewing MD on 7/26/2024 at 8:56 PM           Aortic Dissection Risk Assesment:    High risk Conditions (Marfan, Fhx,Known aortic valve disease, known dissection or recent aortic manipulation) none  High Risk Pain Features (Severe. Abrupt Ripping or tearing) none  High Risk Exam Features (pulse deficit, BP difference, focal neuro deficit,murmur of aortic insufficiency, hypotension or shock) none     Medical Decision Making  Vital stable in the ED.  No acute distress.  I ordered interpreted labs including chemistry which shows ongoing CKD, as well as slight hyperkalemia, high-sensitivity troponin within normal limits.  Per my independent interpretation of chest x-ray, no mediastinal widening.  D dissection and PE was considered however felt to be less likely at this time and as such advanced imaging will be deferred.  Espite nitroglycerin and aspirin in the field as well as morphine in the ED continues to have ongoing pain.  Given ongoing pain, risk factors and age will admit for cardiac rule out.  Discussed with Dr. Sandra for admission.    Problems Addressed:  Chest pain with moderate risk for cardiac etiology: complicated acute illness or injury with systemic symptoms that poses a threat to life or bodily functions    Amount and/or Complexity of Data Reviewed  Independent Historian: EMS     Details: EMS gave SL nitroglycerin and full dose aspirin without relief.      Labs: ordered. Decision-making details documented in ED Course.  Radiology: ordered and independent interpretation performed. Decision-making details documented in ED  Course.  ECG/medicine tests: ordered and independent interpretation performed. Decision-making details documented in ED Course.  Discussion of management or test interpretation with external provider(s): As above    Risk  Parenteral controlled substances.  Decision regarding hospitalization.    Disposition and Plan     Clinical Impression:  1. Chest pain with moderate risk for cardiac etiology        Disposition:  Admit    Hospital Problems       Present on Admission             ICD-10-CM Noted POA    * (Principal) Chest pain with moderate risk for cardiac etiology R07.9 7/26/2024 Unknown                7/26 H&P  Chief Complaint:      Chief Complaint   Patient presents with    Chest Pain         History of Present Illness:  Julio Velasquez is a(n) 73 year old male, with history significant for hyperlipidemia dementia and cocaine use with a complaint of substernal chest pain that started rather spontaneously earlier yesterday.  Describes the pain as a 9 out of 10 sharp nonradiating aggravating or relieving factors.  Denies any associated shortness of breath nausea vomiting palpitations or diaphoresis.  Denies having experienced similar symptoms in the past.  Claims his current pain level is a 5 out of 10      Constitutional:  Weakness, Fatigue.       Temp:  [97.6 °F (36.4 °C)] 97.6 °F (36.4 °C)  Pulse:  [50-61] 51  Resp:  [11-18] 11  BP: (119-133)/(63-77) 133/76  SpO2:  [97 %-99 %] 99 %       Assessment and Plan:     Chest pain possible ACS  Initial troponin negative, EKG with no acute changes will get serial troponins to further evaluate.  If troponins negative will get stress test in a.m.     Hyperkalemia  IV fluids initiated, repeat BMP in AM.     CKD stage III  Renal function currently at baseline, will continue to monitor as needed, avoid nephrotoxic medications.     Tobacco abuse  Patient counseled encouraged to quit.     Prophylaxis  Subcutaneous heparin     CODE STATUS  Full     Primary care physician  No  primary care provider on file.      Vitals (last day) before discharge       Date/Time Temp Pulse Resp BP SpO2 Weight O2 Device O2 Flow Rate (L/min) [facility]    24 1400 -- 52 -- -- -- -- -- --     24 1327 98.5 °F (36.9 °C) 53 18 154/78 98 % -- None (Room air) -- SK    24 1119 97.6 °F (36.4 °C) 65 18 140/71 95 % -- None (Room air) -- SK    24 0614 97.6 °F (36.4 °C) 59 16 145/84 96 % -- None (Room air) --     24 0214 -- 56 -- -- -- -- -- --     24 012 -- -- -- -- -- 138 lb 9.6 oz (62.9 kg) -- --     24 0011 97.7 °F (36.5 °C) 58 16 151/86 95 % -- None (Room air) --     24 -- 53 15 140/71 98 % -- -- --     24 -- 52 12 144/72 97 % -- -- --     24 -- 51 18 125/77 98 % -- -- --     24 -- 50 20 127/72 97 % -- -- --     24 -- 51 11 133/76 99 % -- -- --     24 -- 50 -- 122/74 97 % -- -- --     24 -- 53 15 119/77 98 % -- -- --     24 -- 54 15 121/63 98 % -- -- --     24 97.6 °F (36.4 °C) 61 18 126/67 99 % 145 lb (65.8 kg) -- -- CHRISTINE             --------------  DISCHARGE REVIEW    Payor: CECILE WALLACE [employer]  Subscriber #:  [id number]  Authorization Number: [id number]    Admit date: 24  Admit time:  12:03 AM  Discharge Date: 2024  2:50 PM     Admitting Physician: Peter Sandra MD  Attending Physician:  No att. providers found  Primary Care Physician: No primary care provider on file.          Discharge Summary Notes        Discharge Summary signed by Shantell Osborn MD at 2024  1:40 PM       Author: Shantell Osborn MD Specialty: HOSPITALIST Author Type: Physician    Filed: 2024  1:40 PM Date of Service: 2024  1:37 PM Status: Signed    : Shantell Osborn MD (Physician)         [facility]  [facility] of [facility]    Discharge Summary    Julio Velasquez Patient Status:  Inpatient    1950 MRN [id number]   Location Reading  [id number] Attending Shantell Osborn MD   Hosp Day # 0 PCP No primary care provider on file.     Date of Admission: 7/26/2024 Disposition: Home or Self Care     Date of Discharge: 07/27/24      Admitting Diagnosis: Chest pain with moderate risk for cardiac etiology [R07.9]    Hospital Discharge Diagnoses:  chest pain    Lace+ Score: 37  59-90 High Risk  29-58 Medium Risk  0-28   Low Risk.    TCM Follow-Up Recommendation:  LACE 29-58: Moderate Risk of readmission after discharge from the hospital.      Problem List:   Patient Active Problem List   Diagnosis    Chest pain with moderate risk for cardiac etiology       Reason for Admission:   Chest pain  Hyperkalemia      Physical Exam:   General appearance: alert, appears stated age and cooperative  Pulmonary:  clear to auscultation bilaterally  Cardiovascular: S1, S2 normal, no murmur, click, rub or gallop, regular rate and rhythm  Abdominal: soft, non-tender; bowel sounds normal; no masses,  no organomegaly  Extremities: extremities normal, atraumatic, no cyanosis or edema  Psychiatric: calm      History of Present Illness:   Per Dr. Boaz Kim SHADY Velasquez is a(n) 73 year old male, with history significant for hyperlipidemia dementia and cocaine use with a complaint of substernal chest pain that started rather spontaneously earlier yesterday.  Describes the pain as a 9 out of 10 sharp nonradiating aggravating or relieving factors.  Denies any associated shortness of breath nausea vomiting palpitations or diaphoresis.  Denies having experienced similar symptoms in the past.  Claims his current pain level is a 5 out of 10     Hospital Course:   Chest pain possible ACS  -troponins negative  -nuclear stress test--> ef 67%, no wall motion abnormalities    Mild HTN  -plan ambulatory bp monitoring and follow-up with pcp     Hyperkalemia  -IV fluids initiated, repeat BMP improve  -op f/u     CKD stage III  Renal function currently at baseline  -op follow-up     Tobacco  abuse  Patient counseled encouraged to quit.    Consultations: n/a    Procedures: n/a    Complications: n/a    Discharge Condition: Good    Discharge Medications:      Discharge Medications        CONTINUE taking these medications        Instructions Prescription details   atorvastatin 10 MG Tabs  Commonly known as: Lipitor      Take 4 tablets (40 mg total) by mouth nightly.   Refills: 0     HYDROcodone-acetaminophen 5-325 MG Tabs  Commonly known as: Norco      Take 1 tablet by mouth every 4 (four) hours as needed for Pain. 1 tab 4-6 hours as needed.   Refills: 0              Follow up Visits: Follow-up with pcp in 1 week    Follow up Labs: n/a     Other Discharge Instructions: ambulatory bp monitoring    SHARON QUINTERO MD  7/27/2024  1:37 PM    > 35 min     Electronically signed by Sharon Quintero MD on 7/27/2024  1:40 PM         REVIEWER COMMENTS

## 2024-07-30 ENCOUNTER — PATIENT OUTREACH (OUTPATIENT)
Dept: CASE MANAGEMENT | Age: 74
End: 2024-07-30

## 2024-07-30 NOTE — PROGRESS NOTES
Attempted to reach the patient to complete a Transitional Care Management-Hospital follow up call. The phone rang multiple times without an answer or option for voicemail.

## 2024-07-30 NOTE — PROGRESS NOTES
Attempted to reach the patient to complete a Transitional Care Management-Hospital follow up call. The phone rang multiple times without an answer. The patient's voicemail box was full and nurse care manager was unable to leave a message. Nurse care manager will try to reach the patient again later today.

## (undated) NOTE — LETTER
No information on file.    Consent for Diagnostic Services         Date of Procedure:     The physician has ordered a * No procedures found * to determine heart function. The information obtained will aid in detecting the possible presence of heart disease, to determine why related symptoms may be occurring, and to determine an appropriate plan of medical management.    The risks associated with any exercise test or pharmacological stress test are similar to the risks associated with exercise, including an abnormal blood pressure, dizziness, fainting, abnormal heart rate and in very rare instances, heart attack, stroke, or death. During the test, every effort will be made to minimize such risks by careful observation, however any unusual feeling or symptoms experienced should be reported. Emergency equipment and trained personnel are available to assist with unusual situations, which may arise, and these personnel have permission to perform treatment.    During the treadmill or nuclear stress test, the exercise intensity begins at a low level and advances in stages depending on fitness level. The test may stop at any time because of signs of fatigue, changes in heart rate, electrocardiogram, or blood pressure, or any other symptoms experienced.     If a pharmacological stress test has been ordered, symptoms may include: headache, shortness of breath, flushing, warmth, rapid heart rate, or a bitter taste in the mouth. Sometimes symptoms may not be experienced. Prompt reporting of any symptoms is very important. This could include either regadenoson or dobutamine.    During a regadenoson stress test, an injection of regadenoson is administered. Immediately after the regadenoson is given, there is an injection of a radioactive isotope. During a dobutamine stress test, dobutamine infuses over approximately fifteen minutes. A radioactive isotope is injected during the infusion. After either pharmacological stress test, a  nuclear scan or an echocardiogram will be performed.    The information obtained during testing will be treated as privileged and confidential. The information obtained will be given to my doctor, may be used for insurance purposes or to track and trend data as part of  with privacy retained.    I have read and understand the above information. I voluntarily agree and consent to the above ordered test. Furthermore, no guarantees or assurances have been given by anyone as to the results that may be obtained from this procedure. Any questions that may have occurred to me have been answered to my satisfaction.      [  ]  Patient denies pregnancy or not applicable     [  ]  Patient denies breastfeeding    Signature of Patient:   _______________________________________________________________  Responsible person in case of minor, unconscious: ________________________________________  Relationship to patient: ______________________________________________________________    Signature of Witness: _________________________________Date: ___________Time: __________      Patient Name: Julio Velasquez : 1950  Printed: 2024   Medical Record #: [id number]

## (undated) NOTE — LETTER
[place] [address]    Consent for Diagnostic Services    Your physician has ordered one of the following tests to determine the function of your heart: Regadenoson Sestamibi. The information obtained will aid your physician in detecting the possible presence of heart disease, to determine why you may have such symptoms such as chest pain or shortness of breath and to determine an appropriate plan of medical management.    The risks associated with any exercise test or pharmacological stress test are similar to the risks associated with exercise, including an abnormal blood pressure, dizziness, fainting, abnormal heart rate and in very rear instances heart attach, stroke, or death. During the test you must report any unusual feeling or symptoms you experience during the test. Every effort will be made to minimize such risks by careful observation during the test. Emergency equipment and trained personnel are available to deal with unusual situations, which may arise and these personnel have permission to treat you.     During the treadmill or nuclear stress test, the exercise intensity begins at a low level and advances in stages depending on your fitness level. We may stop the test at any time because of signs of fatigue or changes in your heart rate, electrocardiogram, or blood pressure, or other symptoms you may experience.     If your doctor has ordered a pharmacological stress test, you may experience a headache, shortness of breath, flushing, warmth, rapid heart rate, or a bitter taste in your mouth. Sometimes you may not experience any symptoms. Your prompt reporting of any symptoms is very important.     During a Regadenoson stress test, you are given an injection of Regadenoson. Immediately after the Regadenoson is given, you will be injected with a radioactive isotope. During a Dobutamine stress test, Dobutamine infuses over approximately fifteen minutes. A radioactive isotope is  injected during the infusion. After either pharmacological stress test, you will have either a nuclear scan or an echocardiogram.    The information that is obtained during your testing will be treated as privileged and confidential. The information obtained will be given to your doctor and may be used for insurance purposes or to track and trend data as part of  with your privacy retained.    I have read and understand the above information. I voluntarily agree and consent to the above ordered test. Furthermore, no guarantees or assurances have been given by anyone as to the results that may be obtained from this procedure. Any questions that may have occurred to me have been answered to my satisfaction.     Signature of Patient:   ____________________________________________________________    Signature of Witness: _______________________________Date: ___________Time: ________